# Patient Record
Sex: FEMALE | Race: ASIAN | URBAN - METROPOLITAN AREA
[De-identification: names, ages, dates, MRNs, and addresses within clinical notes are randomized per-mention and may not be internally consistent; named-entity substitution may affect disease eponyms.]

---

## 2017-01-31 ENCOUNTER — OFFICE VISIT (OUTPATIENT)
Dept: FAMILY MEDICINE CLINIC | Age: 37
End: 2017-01-31

## 2017-01-31 VITALS
RESPIRATION RATE: 16 BRPM | OXYGEN SATURATION: 99 % | BODY MASS INDEX: 33.01 KG/M2 | WEIGHT: 205.38 LBS | HEIGHT: 66 IN | DIASTOLIC BLOOD PRESSURE: 80 MMHG | SYSTOLIC BLOOD PRESSURE: 106 MMHG | HEART RATE: 88 BPM | TEMPERATURE: 97.9 F

## 2017-01-31 DIAGNOSIS — M51.36 DDD (DEGENERATIVE DISC DISEASE), LUMBAR: ICD-10-CM

## 2017-01-31 DIAGNOSIS — E11.9 CONTROLLED TYPE 2 DIABETES MELLITUS WITHOUT COMPLICATION, WITHOUT LONG-TERM CURRENT USE OF INSULIN (HCC): Primary | ICD-10-CM

## 2017-01-31 DIAGNOSIS — Z51.81 MEDICATION MONITORING ENCOUNTER: ICD-10-CM

## 2017-01-31 RX ORDER — CLONAZEPAM 0.5 MG/1
0.5 TABLET ORAL
Qty: 60 TAB | Refills: 2 | Status: SHIPPED | OUTPATIENT
Start: 2017-01-31 | End: 2022-06-20

## 2017-01-31 RX ORDER — TRAMADOL HYDROCHLORIDE 50 MG/1
TABLET ORAL
Qty: 60 TAB | Refills: 2 | Status: SHIPPED | OUTPATIENT
Start: 2017-01-31 | End: 2022-06-20

## 2017-01-31 NOTE — LETTER
2/7/2017 3:43 PM 
 
[facility] [address] [id number] Dear Wes Roy: 
 
Please find your most recent results below. We have called [phone] several times and have not been able to reach you by phone to go over the results & have not received a call back from you. Please see recommendations at the bottom of this letter and call the office at [phone] ASAP. Resulted Orders HEMOGLOBIN A1C WITH EAG Result Value Ref Range Hemoglobin A1c 12.6 (H) 4.8 - 5.6 % Comment:  
            Pre-diabetes: 5.7 - 6.4 Diabetes: >6.4 Glycemic control for adults with diabetes: <7.0 Estimated average glucose 315 mg/dL Narrative Performed at:  [address]  [id number] : Beth Woodall MD, Phone:  [phone] MICROALBUMIN, UR, RAND W/ MICROALBUMIN/CREA RATIO Result Value Ref Range Creatinine, urine 54.8 Not Estab. mg/dL Microalbumin, urine 136.0 Not Estab. ug/mL Microalb/Creat ratio (ug/mg creat.) 248.2 (H) 0.0 - 30.0 mg/g creat Narrative Performed at:  [address]  [id number] : Beth Woodall MD, Phone:  [phone] CBC WITH AUTOMATED DIFF Result Value Ref Range WBC 7.5 3.4 - 10.8 x10E3/uL  
 RBC 3.79 3.77 - 5.28 x10E6/uL HGB 10.5 (L) 11.1 - 15.9 g/dL HCT 32.4 (L) 34.0 - 46.6 % MCV 86 79 - 97 fL  
 MCH 27.7 26.6 - 33.0 pg  
 MCHC 32.4 31.5 - 35.7 g/dL  
 RDW 13.5 12.3 - 15.4 % PLATELET 138 (H) 815 - 379 x10E3/uL NEUTROPHILS 58 % Lymphocytes 37 % MONOCYTES 4 % EOSINOPHILS 1 % BASOPHILS 0 %  
 ABS. NEUTROPHILS 4.4 1.4 - 7.0 x10E3/uL Abs Lymphocytes 2.8 0.7 - 3.1 x10E3/uL  
 ABS. MONOCYTES 0.3 0.1 - 0.9 x10E3/uL  
 ABS. EOSINOPHILS 0.0 0.0 - 0.4 x10E3/uL  
 ABS. BASOPHILS 0.0 0.0 - 0.2 x10E3/uL IMMATURE GRANULOCYTES 0 %  
 ABS. IMM. GRANS. 0.0 0.0 - 0.1 x10E3/uL Narrative Performed at:  [address]  [id number] : Darletta Dancer MD, Phone:  [phone] METABOLIC PANEL, COMPREHENSIVE Result Value Ref Range Glucose 273 (H) 65 - 99 mg/dL BUN 12 6 - 20 mg/dL Creatinine 0.51 (L) 0.57 - 1.00 mg/dL GFR est non- >59 mL/min/1.73 GFR est  >59 mL/min/1.73  
 BUN/Creatinine ratio 24 (H) 8 - 20 Sodium 136 134 - 144 mmol/L Potassium 4.6 3.5 - 5.2 mmol/L Chloride 99 96 - 106 mmol/L  
 CO2 24 18 - 29 mmol/L Calcium 9.2 8.7 - 10.2 mg/dL Protein, total 7.7 6.0 - 8.5 g/dL Albumin 3.5 3.5 - 5.5 g/dL GLOBULIN, TOTAL 4.2 1.5 - 4.5 g/dL A-G Ratio 0.8 (L) 1.1 - 2.5 Bilirubin, total <0.2 0.0 - 1.2 mg/dL Alk. phosphatase 78 39 - 117 IU/L  
 AST (SGOT) 11 0 - 40 IU/L  
 ALT (SGPT) 9 0 - 32 IU/L Narrative Performed at:  [address]  [id number] : Darletta Dancer MD, Phone:  [phone] RECOMMENDATIONS: 
Let her know her sugar is kristyn high and her cholesterol is also very high, return to office to discuss, bring sugar log with her. Mamie Claus Please call me if you have any questions: [phone] Sincerely, 
 
 
Misty Thomas, BARBRA

## 2017-01-31 NOTE — MR AVS SNAPSHOT
Visit Information Date & Time Provider Department Dept. Phone Encounter #  
 1/31/2017 11:00 AM Bautista Helm NP [address] [facility] [phone] [id number] Upcoming Health Maintenance Date Due  
 FOOT EXAM Q1 9/2/1991 EYE EXAM RETINAL OR DILATED Q1 9/2/1991 Pneumococcal 19-64 Medium Risk (1 of 1 - PPSV23) 9/2/2000 DTaP/Tdap/Td series (1 - Tdap) 9/2/2002 PAP AKA CERVICAL CYTOLOGY 9/2/2002 INFLUENZA AGE 9 TO ADULT 8/1/2016 HEMOGLOBIN A1C Q6M 10/11/2016 MICROALBUMIN Q1 4/11/2017 LIPID PANEL Q1 4/11/2017 Allergies as of 1/31/2017  Review Complete On: 1/31/2017 By: Christopher Yan LPN Severity Noted Reaction Type Reactions Tylenol [Acetaminophen]  01/31/2017    Itching Current Immunizations  Reviewed on 11/3/2015 No immunizations on file. Not reviewed this visit You Were Diagnosed With   
  
 Codes Comments Controlled type 2 diabetes mellitus without complication, without long-term current use of insulin ([facility]    -  Primary ICD-10-CM: E11.9 ICD-9-CM: 250.00   
 DDD (degenerative disc disease), lumbar     ICD-10-CM: M51.36 
ICD-9-CM: 722.52 Medication monitoring encounter     ICD-10-CM: Z51.81 
ICD-9-CM: V58.83 Vitals BP Pulse Temp Resp Height(growth percentile) Weight(growth percentile) 106/80 88 97.9 °F (36.6 °C) (Oral) 16 5' 6\" (1.676 m) 205 lb 6 oz (93.2 kg) SpO2 BMI OB Status Smoking Status 99% 33.15 kg/m2 Injection Current Every Day Smoker Vitals History BMI and BSA Data Body Mass Index Body Surface Area  
 33.15 kg/m 2 2.08 m 2 Preferred Pharmacy Pharmacy Name Phone [address] [phone] Your Updated Medication List  
  
   
This list is accurate as of: 1/31/17 11:44 AM.  Always use your most recent med list.  
  
  
  
  
 * Blood-Glucose Meter monitoring kit Commonly known as:  TRUERESULT BLOOD GLUCOSE SYSTM Dx: 250.00 * Blood-Glucose Meter monitoring kit Commonly known as:  TRUERESULT BLOOD GLUCOSE SYSTM Dx: E11.9 cephALEXin 500 mg capsule Commonly known as:  Fara Rummer TK 1 C PO QID  
  
 cetirizine 10 mg tablet Commonly known as:  ZYRTEC Take 1 Tab by mouth daily. clonazePAM 0.5 mg tablet Commonly known as:  Elta Halsted Take 1 Tab by mouth every twelve (12) hours as needed. For anxiety  
  
 glucose blood VI test strips strip Commonly known as:  TRUETEST TEST STRIPS Bid monitoring dx: E11.9 Lancets Misc Bid monitoring dx: E11.9  
  
 medroxyPROGESTERone 150 mg/mL injection Commonly known as:  DEPO-PROVERA  
  
 metFORMIN 500 mg tablet Commonly known as:  GLUCOPHAGE  
TAKE 2 TABLETS TWICE DAILY WITH FOOD SITagliptin 100 mg tablet Commonly known as:  Rosa Elena Pipe Take 1 Tab by mouth daily. traMADol 50 mg tablet Commonly known as:  ULTRAM  
TAKE 1 TABLET BY MOUTH THREE TIMES DAILY AS NEEDED FOR PAIN, MUST LAST 30 DAYS * Notice: This list has 2 medication(s) that are the same as other medications prescribed for you. Read the directions carefully, and ask your doctor or other care provider to review them with you. Prescriptions Printed Refills  
 traMADol (ULTRAM) 50 mg tablet 2 Sig: TAKE 1 TABLET BY MOUTH THREE TIMES DAILY AS NEEDED FOR PAIN, MUST LAST 30 DAYS Class: Print We Performed the Following CBC WITH AUTOMATED DIFF [49248 CPT(R)] HEMOGLOBIN A1C WITH EAG [92532 CPT(R)] METABOLIC PANEL, COMPREHENSIVE [10051 CPT(R)] MICROALBUMIN, UR, RAND W/ MICROALBUMIN/CREA RATIO [id number] CPT(R)] Introducing [facility]! Kim Mauricio introduces Snaptu patient portal. Now you can access parts of your medical record, email your doctor's office, and request medication refills online.    
 
1. In your internet browser, go to [email or web address] 2. Click on the First Time User? Click Here link in the Sign In box. You will see the New Member Sign Up page. 3. Enter your Relayr Access Code exactly as it appears below. You will not need to use this code after youve completed the sign-up process. If you do not sign up before the expiration date, you must request a new code. · Relayr Access Code: [email or web address] Expires: 5/1/2017 11:44 AM 
 
4. Enter the last four digits of your Social Security Number (xxxx) and Date of Birth (mm/dd/yyyy) as indicated and click Submit. You will be taken to the next sign-up page. 5. Create a Relayr ID. This will be your Relayr login ID and cannot be changed, so think of one that is secure and easy to remember. 6. Create a Relayr password. You can change your password at any time. 7. Enter your Password Reset Question and Answer. This can be used at a later time if you forget your password. 8. Enter your e-mail address. You will receive e-mail notification when new information is available in [address] 9. Click Sign Up. You can now view and download portions of your medical record. 10. Click the Download Summary menu link to download a portable copy of your medical information. If you have questions, please visit the Frequently Asked Questions section of the Relayr website. Remember, Relayr is NOT to be used for urgent needs. For medical emergencies, dial 911. Now available from your iPhone and Android! Please provide this summary of care documentation to your next provider. Your primary care clinician is listed as Sia Perez. If you have any questions after today's visit, please call [phone].

## 2017-01-31 NOTE — PROGRESS NOTES
1. Have you been to the ER, urgent care clinic since your last visit? Hospitalized since your last visit? Yes [facility] in Dec for sore throat    2. Have you seen or consulted any other health care providers outside of the [address] [place] [address] since your last visit? Include any pap smears or colon screening.  No    Chief Complaint   Patient presents with    Follow-up     med ck    Labs     not fasting

## 2017-01-31 NOTE — PROGRESS NOTES
HISTORY OF PRESENT ILLNESS  Rossy Dove is a 29 y.o. female. HPI  Diabetes Mellitus:  She has diabetes mellitus, and  hypertension and hyperlipidemia. Diabetic ROS - medication compliance: compliant all of the time, diabetic diet compliance: noncompliant some of the time, home glucose monitoring: fasting values range 80 or higher. Lab review: orders written for new lab studies as appropriate; see orders. DDD lumbar/Anxiety:  She is requesting a refill of her tramadol and klonopin  Seeing [facility] and likely to have surgery in April  Nerves are much improved on the scheduled klonopin bid    Patient Active Problem List    Diagnosis Date Noted    DDD (degenerative disc disease), lumbar 11/03/2015    Noncompliance 11/03/2015    Diabetes mellitus type 2, controlled ([facility] 06/18/2013    Depression     Anxiety      Current Outpatient Prescriptions   Medication Sig Dispense Refill    traMADol (ULTRAM) 50 mg tablet TAKE 1 TABLET BY MOUTH THREE TIMES DAILY AS NEEDED FOR PAIN, MUST LAST 30 DAYS 60 Tab 2    clonazePAM (KLONOPIN) 0.5 mg tablet Take 1 Tab by mouth every twelve (12) hours as needed. For anxiety 60 Tab 2    medroxyPROGESTERone (DEPO-PROVERA) 150 mg/mL injection   3    Blood-Glucose Meter (TRUERESULT BLOOD GLUCOSE SYSTM) monitoring kit Dx: E11.9 1 Kit 0    glucose blood VI test strips (TRUETEST TEST STRIPS) strip Bid monitoring dx: E11.9 100 Strip 11    Lancets misc Bid monitoring dx: E11.9 100 Each 11    cetirizine (ZYRTEC) 10 mg tablet Take 1 Tab by mouth daily. 30 Tab 5    sitaGLIPtin (JANUVIA) 100 mg tablet Take 1 Tab by mouth daily.  30 Tab 5    metFORMIN (GLUCOPHAGE) 500 mg tablet TAKE 2 TABLETS TWICE DAILY WITH FOOD 120 Tab 5    Blood-Glucose Meter (TRUERESULT BLOOD GLUCOSE SYSTM) monitoring kit Dx: 250.00 1 kit 0    cephALEXin (KEFLEX) 500 mg capsule TK 1 C PO QID  0     Family History   Problem Relation Age of Onset    Depression Mother      Social History   Substance Use Topics    Smoking status: Current Every Day Smoker     Years: 1.00     Types: Cigarettes    Smokeless tobacco: Never Used    Alcohol use No        ROS  A comprehensive review of system was obtained and negative except findings in the HPI    Visit Vitals    /80    Pulse 88    Temp 97.9 °F (36.6 °C) (Oral)    Resp 16    Ht 5' 6\" (1.676 m)    Wt 205 lb 6 oz (93.2 kg)    SpO2 99%    BMI 33.15 kg/m2     Physical Exam   Constitutional: She is oriented to person, place, and time. She appears well-developed and well-nourished. Neck: No JVD present. Cardiovascular: Normal rate, regular rhythm and intact distal pulses. Exam reveals no gallop and no friction rub. No murmur heard. Pulmonary/Chest: Effort normal and breath sounds normal. No respiratory distress. She has no wheezes. Musculoskeletal: She exhibits no edema. Neurological: She is alert and oriented to person, place, and time. Skin: Skin is warm. Nursing note and vitals reviewed. ASSESSMENT and PLAN  Encounter Diagnoses   Name Primary?  Controlled type 2 diabetes mellitus without complication, without long-term current use of insulin (HCC) Yes    DDD (degenerative disc disease), lumbar     Medication monitoring encounter      Orders Placed This Encounter    HEMOGLOBIN A1C WITH EAG    MICROALBUMIN, UR, RAND W/ MICROALBUMIN/CREA RATIO    CBC WITH AUTOMATED DIFF    METABOLIC PANEL, COMPREHENSIVE    traMADol (ULTRAM) 50 mg tablet    clonazePAM (KLONOPIN) 0.5 mg tablet     Labs updated today  Refills also given  Reviewed adr/se of med  Recheck 3 mo    I have discussed the diagnosis with the patient and the intended plan as seen in the above orders. The patient has received an after-visit summary and questions were answered concerning future plans. Patient conveyed understanding of the plan at the time of the visit.     Alda Hernandez, MSN, ANP  1/31/2017

## 2017-02-01 LAB
ALBUMIN SERPL-MCNC: 3.5 G/DL (ref 3.5–5.5)
ALBUMIN/CREAT UR: 248.2 MG/G CREAT (ref 0–30)
ALBUMIN/GLOB SERPL: 0.8 {RATIO} (ref 1.1–2.5)
ALP SERPL-CCNC: 78 IU/L (ref 39–117)
ALT SERPL-CCNC: 9 IU/L (ref 0–32)
AST SERPL-CCNC: 11 IU/L (ref 0–40)
BASOPHILS # BLD AUTO: 0 X10E3/UL (ref 0–0.2)
BASOPHILS NFR BLD AUTO: 0 %
BILIRUB SERPL-MCNC: <0.2 MG/DL (ref 0–1.2)
BUN SERPL-MCNC: 12 MG/DL (ref 6–20)
BUN/CREAT SERPL: 24 (ref 8–20)
CALCIUM SERPL-MCNC: 9.2 MG/DL (ref 8.7–10.2)
CHLORIDE SERPL-SCNC: 99 MMOL/L (ref 96–106)
CO2 SERPL-SCNC: 24 MMOL/L (ref 18–29)
CREAT SERPL-MCNC: 0.51 MG/DL (ref 0.57–1)
CREAT UR-MCNC: 54.8 MG/DL
EOSINOPHIL # BLD AUTO: 0 X10E3/UL (ref 0–0.4)
EOSINOPHIL NFR BLD AUTO: 1 %
ERYTHROCYTE [DISTWIDTH] IN BLOOD BY AUTOMATED COUNT: 13.5 % (ref 12.3–15.4)
EST. AVERAGE GLUCOSE BLD GHB EST-MCNC: 315 MG/DL
GLOBULIN SER CALC-MCNC: 4.2 G/DL (ref 1.5–4.5)
GLUCOSE SERPL-MCNC: 273 MG/DL (ref 65–99)
HBA1C MFR BLD: 12.6 % (ref 4.8–5.6)
HCT VFR BLD AUTO: 32.4 % (ref 34–46.6)
HGB BLD-MCNC: 10.5 G/DL (ref 11.1–15.9)
IMM GRANULOCYTES # BLD: 0 X10E3/UL (ref 0–0.1)
IMM GRANULOCYTES NFR BLD: 0 %
LYMPHOCYTES # BLD AUTO: 2.8 X10E3/UL (ref 0.7–3.1)
LYMPHOCYTES NFR BLD AUTO: 37 %
MCH RBC QN AUTO: 27.7 PG (ref 26.6–33)
MCHC RBC AUTO-ENTMCNC: 32.4 G/DL (ref 31.5–35.7)
MCV RBC AUTO: 86 FL (ref 79–97)
MICROALBUMIN UR-MCNC: 136 UG/ML
MONOCYTES # BLD AUTO: 0.3 X10E3/UL (ref 0.1–0.9)
MONOCYTES NFR BLD AUTO: 4 %
NEUTROPHILS # BLD AUTO: 4.4 X10E3/UL (ref 1.4–7)
NEUTROPHILS NFR BLD AUTO: 58 %
PLATELET # BLD AUTO: 501 X10E3/UL (ref 150–379)
POTASSIUM SERPL-SCNC: 4.6 MMOL/L (ref 3.5–5.2)
PROT SERPL-MCNC: 7.7 G/DL (ref 6–8.5)
RBC # BLD AUTO: 3.79 X10E6/UL (ref 3.77–5.28)
SODIUM SERPL-SCNC: 136 MMOL/L (ref 134–144)
WBC # BLD AUTO: 7.5 X10E3/UL (ref 3.4–10.8)

## 2017-02-02 RX ORDER — MEDROXYPROGESTERONE ACETATE 150 MG/ML
INJECTION, SUSPENSION INTRAMUSCULAR
Qty: 1 ML | Refills: 0 | Status: SHIPPED | OUTPATIENT
Start: 2017-02-02 | End: 2017-02-18 | Stop reason: SDUPTHER

## 2017-02-03 NOTE — PROGRESS NOTES
Let her know her sugar is kristyn high and her cholesterol is also very high, return to office to discuss, bring sugar log with her.  Rocky Mcginnis

## 2017-02-20 RX ORDER — MEDROXYPROGESTERONE ACETATE 150 MG/ML
INJECTION, SUSPENSION INTRAMUSCULAR
Qty: 1 ML | Refills: 0 | Status: SHIPPED | OUTPATIENT
Start: 2017-02-20 | End: 2017-07-15 | Stop reason: SDUPTHER

## 2017-03-06 RX ORDER — LORATADINE 10 MG/1
TABLET ORAL
Qty: 30 TAB | Refills: 0 | Status: SHIPPED | OUTPATIENT
Start: 2017-03-06 | End: 2022-06-20

## 2017-03-06 RX ORDER — BLOOD-GLUCOSE METER
EACH MISCELLANEOUS
Qty: 1 EACH | Refills: 0 | Status: SHIPPED | OUTPATIENT
Start: 2017-03-06

## 2017-03-06 RX ORDER — METFORMIN HYDROCHLORIDE 500 MG/1
TABLET ORAL
Qty: 120 TAB | Refills: 0 | Status: SHIPPED | OUTPATIENT
Start: 2017-03-06 | End: 2022-06-20 | Stop reason: SDUPTHER

## 2017-03-18 RX ORDER — SITAGLIPTIN 100 MG/1
TABLET, FILM COATED ORAL
Qty: 30 TAB | Refills: 0 | Status: SHIPPED | OUTPATIENT
Start: 2017-03-18 | End: 2022-06-20

## 2017-03-24 ENCOUNTER — DOCUMENTATION ONLY (OUTPATIENT)
Dept: FAMILY MEDICINE CLINIC | Age: 37
End: 2017-03-24

## 2017-03-24 NOTE — PROGRESS NOTES
Prior authorization for Klonopin & tramadol form completed and faxed to [phone] w/ confirmation.

## 2017-04-03 ENCOUNTER — DOCUMENTATION ONLY (OUTPATIENT)
Dept: FAMILY MEDICINE CLINIC | Age: 37
End: 2017-04-03

## 2017-05-02 RX ORDER — SITAGLIPTIN 100 MG/1
TABLET, FILM COATED ORAL
Qty: 30 TAB | Refills: 0 | Status: SHIPPED | OUTPATIENT
Start: 2017-05-02 | End: 2022-06-20 | Stop reason: SDUPTHER

## 2017-05-21 RX ORDER — CETIRIZINE HCL 10 MG
TABLET ORAL
Qty: 30 TAB | Refills: 0 | Status: SHIPPED | OUTPATIENT
Start: 2017-05-21 | End: 2022-06-20

## 2017-07-17 RX ORDER — MEDROXYPROGESTERONE ACETATE 150 MG/ML
INJECTION, SUSPENSION INTRAMUSCULAR
Qty: 1 ML | Refills: 0 | Status: SHIPPED | OUTPATIENT
Start: 2017-07-17 | End: 2022-06-20

## 2017-07-17 RX ORDER — SITAGLIPTIN 100 MG/1
TABLET, FILM COATED ORAL
Qty: 30 TAB | Refills: 0 | Status: SHIPPED | OUTPATIENT
Start: 2017-07-17 | End: 2022-06-20 | Stop reason: SDUPTHER

## 2017-07-26 RX ORDER — METFORMIN HYDROCHLORIDE 500 MG/1
TABLET ORAL
Qty: 120 TAB | Refills: 0 | Status: SHIPPED | OUTPATIENT
Start: 2017-07-26 | End: 2022-06-20 | Stop reason: SDUPTHER

## 2017-07-28 RX ORDER — SITAGLIPTIN 100 MG/1
TABLET, FILM COATED ORAL
Qty: 30 TAB | Refills: 0 | Status: SHIPPED | OUTPATIENT
Start: 2017-07-28 | End: 2022-06-20 | Stop reason: SDUPTHER

## 2022-06-20 ENCOUNTER — OFFICE VISIT (OUTPATIENT)
Dept: FAMILY MEDICINE CLINIC | Age: 42
End: 2022-06-20
Payer: MEDICAID

## 2022-06-20 VITALS
OXYGEN SATURATION: 97 % | SYSTOLIC BLOOD PRESSURE: 117 MMHG | HEIGHT: 66 IN | WEIGHT: 230 LBS | RESPIRATION RATE: 16 BRPM | BODY MASS INDEX: 36.96 KG/M2 | HEART RATE: 101 BPM | TEMPERATURE: 98.1 F | DIASTOLIC BLOOD PRESSURE: 84 MMHG

## 2022-06-20 DIAGNOSIS — R05.9 COUGH: ICD-10-CM

## 2022-06-20 DIAGNOSIS — M25.562 CHRONIC PAIN OF LEFT KNEE: ICD-10-CM

## 2022-06-20 DIAGNOSIS — G89.29 CHRONIC PAIN OF LEFT KNEE: ICD-10-CM

## 2022-06-20 DIAGNOSIS — E11.9 CONTROLLED TYPE 2 DIABETES MELLITUS WITHOUT COMPLICATION, WITHOUT LONG-TERM CURRENT USE OF INSULIN (HCC): Primary | ICD-10-CM

## 2022-06-20 PROCEDURE — 99204 OFFICE O/P NEW MOD 45 MIN: CPT | Performed by: FAMILY MEDICINE

## 2022-06-20 RX ORDER — FLUTICASONE PROPIONATE AND SALMETEROL 100; 50 UG/1; UG/1
1 POWDER RESPIRATORY (INHALATION) EVERY 12 HOURS
Qty: 60 EACH | Refills: 2 | Status: SHIPPED | OUTPATIENT
Start: 2022-06-20 | End: 2022-06-21

## 2022-06-20 RX ORDER — METFORMIN HYDROCHLORIDE 500 MG/1
1500 TABLET, EXTENDED RELEASE ORAL
Qty: 90 TABLET | Refills: 3 | Status: SHIPPED | OUTPATIENT
Start: 2022-06-20 | End: 2022-10-25

## 2022-06-20 RX ORDER — MELOXICAM 15 MG/1
15 TABLET ORAL DAILY
Qty: 30 TABLET | Refills: 3 | Status: SHIPPED | OUTPATIENT
Start: 2022-06-20 | End: 2022-10-25

## 2022-06-20 NOTE — PROGRESS NOTES
Chief Complaint   Patient presents with    Diabetes     BS: 98 this am    Labs     NON Fasting    Leg Pain     Left le[address]:  Miniscus repair    Nicotine Dependence     Cough X 2 months     1. Have you been to the ER, urgent care clinic since your last visit? Hospitalized since your last visit? No    2. Have you seen or consulted any other health care providers outside of the [address] [place] [address] since your last visit? Include any pap smears or colon screening. Yes Where:  [address]:  Miniscus repair  Lab Results   Component Value Date/Time    Microalb/Creat ratio (ug/mg creat.) 248.2 (H) 2017 11:48 AM      Chief Complaint   Patient presents with    Diabetes     BS: 98 this am    Labs     NON Fasting    Leg Pain     Left le[address]:  Miniscus repair    Nicotine Dependence     Cough X 2 months     she is a 37y.o. year old female who presents for evaluation. See Diabetic Report Card listed above. Patient Active Problem List    Diagnosis    DDD (degenerative disc disease), lumbar    Noncompliance    Diabetes mellitus type 2, controlled ([address]    Depression    Anxiety       Reviewed PmHx, RxHx, FmHx, SocHx, AllgHx--dated and updated in the chart. Review of Systems - negative except as listed above in the HPI    Objective:     Vitals:    22 1514   BP: 117/84   Pulse: (!) 101   Resp: 16   Temp: 98.1 °F (36.7 °C)   TempSrc: Oral   SpO2: 97%   Weight: 230 lb (104.3 kg)   Height: 5' 6\" (1.676 m)         Assessment/ Plan:   Diagnoses and all orders for this visit:    1. Controlled type 2 diabetes mellitus without complication, without long-term current use of insulin ([address]  -     LIPID PANEL; Future  -     METABOLIC PANEL, COMPREHENSIVE; Future  -     CBC WITH AUTOMATED DIFF; Future  -     TSH 3RD GENERATION; Future  -     MICROALBUMIN, UR, RAND W/ MICROALB/CREAT RATIO; Future  -     HEMOGLOBIN A1C WITH EAG;  Future  - metFORMIN ER (GLUCOPHAGE XR) 500 mg tablet; Take 3 Tablets by mouth daily (with dinner). -out of rx for two months  -incarcerated for 4 years  -restart rx above    2. Chronic pain of left knee  -     REFERRAL TO ORTHOPEDICS  -     meloxicam (MOBIC) 15 mg tablet; Take 1 Tablet by mouth daily. Indications: pain    3. Cough  -     fluticasone propion-salmeteroL (ADVAIR/WIXELA) 100-50 mcg/dose diskus inhaler; Take 1 Puff by inhalation every twelve (12) hours. -dwp to dc smoking       Follow-up and Dispositions    · Return in about 3 months (around 9/20/2022). Lab Results   Component Value Date/Time    Cholesterol, total 242 (H) 04/11/2016 11:57 AM    HDL Cholesterol 57 04/11/2016 11:57 AM    LDL, calculated 158 (H) 04/11/2016 11:57 AM    Triglyceride 135 04/11/2016 11:57 AM     Lab Results   Component Value Date/Time    Hemoglobin A1c 12.6 (H) 01/31/2017 11:48 AM    Hemoglobin A1c 12.9 (H) 04/11/2016 11:57 AM    Hemoglobin A1c 9.7 (H) 11/03/2015 02:53 PM    Microalb/Creat ratio (ug/mg creat.) 248.2 (H) 01/31/2017 11:48 AM    LDL, calculated 158 (H) 04/11/2016 11:57 AM    Creatinine 0.51 (L) 01/31/2017 11:48 AM          Discussed with patient goal of Diabetes to include:  HgA1C <7, LDL cholesterol <100, Blood pressure <140/80. Discussed with patient diet and weight management and to get regular exercise. Recommend yearly eye exams and daily foot care. The patient understands and agrees with the plan. I have discussed the diagnosis with the patient and the intended plan as seen in the above orders. The patient has received an after-visit summary and questions were answered concerning future plans. Medication Side Effects and Warnings were discussed with patient  Patient Labs were reviewed and or requested  Patient Past Records were reviewed and or requested    Marcin Encarnacion M.D. [address]    There are no Patient Instructions on file for this visit.

## 2022-06-21 ENCOUNTER — TELEPHONE (OUTPATIENT)
Dept: FAMILY MEDICINE CLINIC | Age: 42
End: 2022-06-21

## 2022-06-21 LAB
ALBUMIN SERPL-MCNC: 4.6 G/DL (ref 3.8–4.8)
ALBUMIN/CREAT UR: 190 MG/G CREAT (ref 0–29)
ALBUMIN/GLOB SERPL: 1.3 {RATIO} (ref 1.2–2.2)
ALP SERPL-CCNC: 91 IU/L (ref 44–121)
ALT SERPL-CCNC: 15 IU/L (ref 0–32)
AST SERPL-CCNC: 13 IU/L (ref 0–40)
BASOPHILS # BLD AUTO: 0.1 X10E3/UL (ref 0–0.2)
BASOPHILS NFR BLD AUTO: 1 %
BILIRUB SERPL-MCNC: <0.2 MG/DL (ref 0–1.2)
BUN SERPL-MCNC: 27 MG/DL (ref 6–24)
BUN/CREAT SERPL: 35 (ref 9–23)
CALCIUM SERPL-MCNC: 10.4 MG/DL (ref 8.7–10.2)
CHLORIDE SERPL-SCNC: 98 MMOL/L (ref 96–106)
CHOLEST SERPL-MCNC: 256 MG/DL (ref 100–199)
CO2 SERPL-SCNC: 19 MMOL/L (ref 20–29)
CREAT SERPL-MCNC: 0.78 MG/DL (ref 0.57–1)
CREAT UR-MCNC: 32.5 MG/DL
EGFR: 98 ML/MIN/1.73
EOSINOPHIL # BLD AUTO: 0.4 X10E3/UL (ref 0–0.4)
EOSINOPHIL NFR BLD AUTO: 4 %
ERYTHROCYTE [DISTWIDTH] IN BLOOD BY AUTOMATED COUNT: 13 % (ref 11.7–15.4)
EST. AVERAGE GLUCOSE BLD GHB EST-MCNC: 324 MG/DL
GLOBULIN SER CALC-MCNC: 3.5 G/DL (ref 1.5–4.5)
GLUCOSE SERPL-MCNC: 380 MG/DL (ref 65–99)
HBA1C MFR BLD: 12.9 % (ref 4.8–5.6)
HCT VFR BLD AUTO: 45.3 % (ref 34–46.6)
HDLC SERPL-MCNC: 73 MG/DL
HGB BLD-MCNC: 14.8 G/DL (ref 11.1–15.9)
IMM GRANULOCYTES # BLD AUTO: 0 X10E3/UL (ref 0–0.1)
IMM GRANULOCYTES NFR BLD AUTO: 0 %
IMP & REVIEW OF LAB RESULTS: NORMAL
LDLC SERPL CALC-MCNC: 140 MG/DL (ref 0–99)
LYMPHOCYTES # BLD AUTO: 3.5 X10E3/UL (ref 0.7–3.1)
LYMPHOCYTES NFR BLD AUTO: 34 %
MCH RBC QN AUTO: 29 PG (ref 26.6–33)
MCHC RBC AUTO-ENTMCNC: 32.7 G/DL (ref 31.5–35.7)
MCV RBC AUTO: 89 FL (ref 79–97)
MICROALBUMIN UR-MCNC: 61.6 UG/ML
MONOCYTES # BLD AUTO: 0.6 X10E3/UL (ref 0.1–0.9)
MONOCYTES NFR BLD AUTO: 6 %
NEUTROPHILS # BLD AUTO: 5.7 X10E3/UL (ref 1.4–7)
NEUTROPHILS NFR BLD AUTO: 55 %
PLATELET # BLD AUTO: 306 X10E3/UL (ref 150–450)
POTASSIUM SERPL-SCNC: 4.6 MMOL/L (ref 3.5–5.2)
PROT SERPL-MCNC: 8.1 G/DL (ref 6–8.5)
RBC # BLD AUTO: 5.1 X10E6/UL (ref 3.77–5.28)
SODIUM SERPL-SCNC: 134 MMOL/L (ref 134–144)
TRIGL SERPL-MCNC: 242 MG/DL (ref 0–149)
TSH SERPL DL<=0.005 MIU/L-ACNC: 1.67 UIU/ML (ref 0.45–4.5)
VLDLC SERPL CALC-MCNC: 43 MG/DL (ref 5–40)
WBC # BLD AUTO: 10.2 X10E3/UL (ref 3.4–10.8)

## 2022-06-21 RX ORDER — BUDESONIDE AND FORMOTEROL FUMARATE DIHYDRATE 160; 4.5 UG/1; UG/1
2 AEROSOL RESPIRATORY (INHALATION) 2 TIMES DAILY
Qty: 10.2 G | Refills: 2 | Status: SHIPPED | OUTPATIENT
Start: 2022-06-21 | End: 2022-09-23 | Stop reason: SDUPTHER

## 2022-06-21 NOTE — PROGRESS NOTES
As expected, labs are inc A1c due to off rx. I will call in new rx and pt needs to be seen in 3 months.     Michael Schreiber

## 2022-06-21 NOTE — TELEPHONE ENCOUNTER
----- Message from Sabino Gonzalez sent at 6/21/2022  3:22 PM EDT -----  Subject: Message to Provider    QUESTIONS  Information for Provider? Patient is wanting to know if she can get a   different prescription because her insurance does not cover the inhaler   she was prescribed.   ---------------------------------------------------------------------------  --------------  CALL BACK INFO  What is the best way for the office to contact you? OK to leave message on   voicemail  Preferred Call Back Phone Number? [phone]  ---------------------------------------------------------------------------  --------------  SCRIPT ANSWERS  Relationship to Patient?  Self

## 2022-09-23 RX ORDER — BUDESONIDE AND FORMOTEROL FUMARATE DIHYDRATE 160; 4.5 UG/1; UG/1
2 AEROSOL RESPIRATORY (INHALATION) 2 TIMES DAILY
Qty: 10.2 G | Refills: 2 | Status: SHIPPED | OUTPATIENT
Start: 2022-09-23

## 2022-10-24 DIAGNOSIS — M25.562 CHRONIC PAIN OF LEFT KNEE: ICD-10-CM

## 2022-10-24 DIAGNOSIS — G89.29 CHRONIC PAIN OF LEFT KNEE: ICD-10-CM

## 2022-10-24 DIAGNOSIS — E11.9 CONTROLLED TYPE 2 DIABETES MELLITUS WITHOUT COMPLICATION, WITHOUT LONG-TERM CURRENT USE OF INSULIN (HCC): ICD-10-CM

## 2022-10-25 RX ORDER — MELOXICAM 15 MG/1
TABLET ORAL
Qty: 30 TABLET | Refills: 3 | Status: SHIPPED | OUTPATIENT
Start: 2022-10-25

## 2022-10-25 RX ORDER — METFORMIN HYDROCHLORIDE 500 MG/1
TABLET, EXTENDED RELEASE ORAL
Qty: 90 TABLET | Refills: 3 | Status: SHIPPED | OUTPATIENT
Start: 2022-10-25

## 2023-03-28 RX ORDER — BUDESONIDE AND FORMOTEROL FUMARATE DIHYDRATE 160; 4.5 UG/1; UG/1
2 AEROSOL RESPIRATORY (INHALATION) 2 TIMES DAILY
Qty: 10.2 G | Refills: 2 | OUTPATIENT
Start: 2023-03-28

## 2023-06-21 ENCOUNTER — TELEPHONE (OUTPATIENT)
Age: 43
End: 2023-06-21

## 2023-06-21 NOTE — TELEPHONE ENCOUNTER
Called pt and lvm to call us back. Received med refill request for empagliflozin (JARDIANCE) 25 MG tablet & metFORMIN (GLUCOPHAGE-XR) 500 MG extended release tablet.  LVM letting them know once apt made we can send refill request.

## 2023-06-22 RX ORDER — METFORMIN HYDROCHLORIDE 500 MG/1
TABLET, EXTENDED RELEASE ORAL
Qty: 90 TABLET | Refills: 1 | Status: SHIPPED | OUTPATIENT
Start: 2023-06-22

## 2023-11-13 RX ORDER — METFORMIN HYDROCHLORIDE 500 MG/1
TABLET, EXTENDED RELEASE ORAL
Qty: 90 TABLET | Refills: 1 | Status: SHIPPED | OUTPATIENT
Start: 2023-11-13

## 2024-02-12 RX ORDER — METFORMIN HYDROCHLORIDE 500 MG/1
TABLET, EXTENDED RELEASE ORAL
Qty: 90 TABLET | Refills: 1 | Status: SHIPPED | OUTPATIENT
Start: 2024-02-12

## 2024-06-06 RX ORDER — EMPAGLIFLOZIN 25 MG/1
25 TABLET, FILM COATED ORAL DAILY
Qty: 30 TABLET | Refills: 5 | Status: SHIPPED | OUTPATIENT
Start: 2024-06-06

## 2024-09-25 RX ORDER — METFORMIN HYDROCHLORIDE 500 MG/1
TABLET, EXTENDED RELEASE ORAL
Qty: 90 TABLET | Refills: 1 | OUTPATIENT
Start: 2024-09-25